# Patient Record
Sex: MALE | Race: WHITE | Employment: FULL TIME | URBAN - METROPOLITAN AREA
[De-identification: names, ages, dates, MRNs, and addresses within clinical notes are randomized per-mention and may not be internally consistent; named-entity substitution may affect disease eponyms.]

---

## 2021-01-04 ENCOUNTER — HOSPITAL ENCOUNTER (EMERGENCY)
Facility: CLINIC | Age: 61
Discharge: HOME | End: 2021-01-04
Attending: PHYSICIAN ASSISTANT | Admitting: PHYSICIAN ASSISTANT

## 2021-01-04 ENCOUNTER — APPOINTMENT (OUTPATIENT)
Dept: GENERAL RADIOLOGY | Facility: CLINIC | Age: 61
End: 2021-01-04
Attending: PHYSICIAN ASSISTANT

## 2021-01-04 VITALS
OXYGEN SATURATION: 99 % | SYSTOLIC BLOOD PRESSURE: 182 MMHG | TEMPERATURE: 98.7 F | RESPIRATION RATE: 18 BRPM | WEIGHT: 147 LBS | HEART RATE: 116 BPM | DIASTOLIC BLOOD PRESSURE: 74 MMHG

## 2021-01-04 DIAGNOSIS — S63.259A DISLOCATION OF FINGER, INITIAL ENCOUNTER: ICD-10-CM

## 2021-01-04 PROCEDURE — 26770 TREAT FINGER DISLOCATION: CPT | Mod: F1 | Performed by: PHYSICIAN ASSISTANT

## 2021-01-04 PROCEDURE — G0463 HOSPITAL OUTPT CLINIC VISIT: HCPCS | Performed by: PHYSICIAN ASSISTANT

## 2021-01-04 PROCEDURE — 73140 X-RAY EXAM OF FINGER(S): CPT | Mod: LT

## 2021-01-04 PROCEDURE — 999N000065 XR FINGER LT G/E 2 VW: Mod: LT

## 2021-01-04 PROCEDURE — 26720 TREAT FINGER FRACTURE EACH: CPT | Mod: F1 | Performed by: PHYSICIAN ASSISTANT

## 2021-01-04 PROCEDURE — 99213 OFFICE O/P EST LOW 20 MIN: CPT | Mod: 25 | Performed by: PHYSICIAN ASSISTANT

## 2021-01-04 PROCEDURE — 26725 TREAT FINGER FRACTURE EACH: CPT | Mod: 54 | Performed by: PHYSICIAN ASSISTANT

## 2021-01-04 NOTE — ED PROVIDER NOTES
History     Chief Complaint   Patient presents with     Fall     Pt states that he tumbled down a flight of stairs. Pt states that he is sore all over but no major injuries except the index finger on the L hand. Pt states that he thinks its either broke or dislocated.     Hand Injury     HPI  Ziggy Torres is a 60 year old right-hand-dominant male who presents to the urgent care with concern over left index finger pain after he sustained a fall on the stairs yesterday evening.  Patient complains of deformity, swelling, ecchymosis.  He denies any distal numbness or paresthesias.  He reports that he does feel some achiness/soreness in other areas and did sustain lacerations to his scalp but no other major injuries reported.  He denies any current headache, dizziness, lightheadedness, nausea, vomiting, chest pain, dyspnea, wheezing, abdominal pain.  He denies any blood thinner use.  He has not attempted any OTC treatments.      Allergies:  No Known Allergies    Problem List:    There are no active problems to display for this patient.     Past Medical History:    History reviewed. No pertinent past medical history.    Past Surgical History:    History reviewed. No pertinent surgical history.    Family History:    History reviewed. No pertinent family history.    Social History:  Marital Status:   [2]  Social History     Tobacco Use     Smoking status: Never Smoker     Smokeless tobacco: Never Used   Substance Use Topics     Alcohol use: Yes     Comment: socially     Drug use: Never     Medications:    No current outpatient medications on file.    Review of Systems  INTEGUMENTARY/SKIN: POSITIVE for ecchymosis NEGATIVE for lacerations, abrasions or worrisome rashes   RESP:NEGATIVE for significant cough or SOB  MUSCULOSKELETAL: POSITIVE  for left index finger pain, swelling  NEURO: NEGATIVE for headache, dizziness, numbness, paresthesias   Physical Exam   BP: (!) 182/74  Pulse: 116  Temp: 98.7  F (37.1   C)  Resp: 18  Weight: 66.7 kg (147 lb)  SpO2: 99 %  Physical Exam  Constitutional:       General: He is not in acute distress.     Appearance: He is not ill-appearing or toxic-appearing.   HENT:      Head: Normocephalic.     Cardiovascular:      Rate and Rhythm: Normal rate and regular rhythm.      Pulses:           Radial pulses are 2+ on the left side.      Heart sounds: No murmur. No friction rub. No gallop.    Pulmonary:      Effort: Pulmonary effort is normal. No respiratory distress.      Breath sounds: Normal breath sounds. No wheezing, rhonchi or rales.   Musculoskeletal:      Left wrist: Normal.      Left hand: He exhibits decreased range of motion (of PIP and DIP joint of index finger), tenderness, bony tenderness, deformity and swelling. He exhibits normal two-point discrimination, normal capillary refill and no laceration. Normal sensation noted. Normal strength noted.   Skin:     General: Skin is warm and dry.      Findings: Ecchymosis present. No erythema, laceration or rash.   Neurological:      Mental Status: He is alert.      Sensory: No sensory deficit.       ED Course        [facility]     -Dislocation - Upper Extremity  Performed by: Zoey Dozier PA-C  Authorized by: Zoey Dozier PA-C       LOCATION     Location:  Finger    Finger location:  L index finger    Finger dislocation type: PIP      PRE PROCEDURE ASSESSMENT      Pre-procedure imaging:  X-ray    Imaging findings: dislocation present and fracture present      Distal perfusion: normal      ANESTHESIA (see MAR for exact dosages)      Anesthesia method:  Nerve block    Block needle gauge:  30 G    Block anesthetic:  Bupivacaine 0.25% w/o epi    Block technique:  Ring block    Block outcome:  Anesthesia achieved    PROCEDURE DETAILS      Manipulation performed: yes      Finger reduction method:  Traction and counter fraction    Reduction successful: yes      Reduction confirmed with imaging: yes       Immobilization:  Splint    Supplies used:  Aluminum splint    POST PROCEDURE DETAILS     Neurological function: normal      Distal perfusion: normal      Range of motion: improved      PROCEDURE   Patient Tolerance:  Patient tolerated the procedure well with no immediate complications              Critical Care time:  none        Results for orders placed or performed during the hospital encounter of 01/04/21 (from the past 24 hour(s))   Fingers XR, 2-3 views, left    Narrative    Examination:  XR FINGER LT G/E 2 VW    Date:  1/4/2021 4:34 PM     Clinical Information: pain after fall, assess for fracture.     Comparison: none.      Impression    Impression:    1.  Dorsal dislocation at the PIP joint of the left index finger.  2.  Small fracture from the volar base of the middle phalanx, measures  3 mm, located along the volar aspect of the joint.  3.  Soft tissue swelling throughout the finger.    CARLA PALOMINO MD   Fingers XR, 2-3 views, left    Narrative    EXAM: XR FINGER LT G/E 2 VW  LOCATION: [facility]  DATE/TIME: 1/4/2021 5:10 PM    INDICATION: Post reduction, follow-up dislocation.  COMPARISON: 01/04/2021.      Impression    IMPRESSION: Interval reduction of the dorsal dislocation at the level of the PIP joint of the left index finger. This is now in anatomic alignment. There is a calcification along the volar aspect of the base of the middle phalanx worrisome for avulsive   injury to the capsular or potential flexor digitorum tendon attachment.           Medications - No data to display    Assessments & Plan (with Medical Decision Making)     I have reviewed the nursing notes.  I have reviewed the findings, diagnosis, plan and need for follow up with the patient.       There are no discharge medications for this patient.    Final diagnoses:   Dislocation of finger, initial encounter     60-year-old male presents to the urgent care with concern over left index finger pain, decreased  range of motion and deformity after sustaining a fall on the steps yesterday evening.  Physical exam findings as described above are significant for lacerations to his scalp, there is deformity of the left index finger with inability to move the DIP or PIP joints with associated ecchymosis, swelling, tenderness to palpation, capillary refill was within normal limits.  He had x-ray which confirmed dorsal dislocation at the PIP joint, small fracture from the volar base of the middle phalanx that measures approximately 3 mm located along the volar aspect of the joint.  After discussing her/benefits he agreed to proceed with reduction.  He tolerated manual reduction without immediate complications and had return to near normal range of motion, however was still somewhat limited due to discomfort.  Postreduction x-rays did confirm that was in anatomic alignment however there was a calcification along the volar aspect of the base of the middle phalanx worrisome for avulsive injury to the capsular or potential flexor digitorum tendon attachment.  He was placed in an aluminum foam splint for immobilization and referral to orthopedic hand was given for follow-up.  Tylenol/ibuprofen as needed for pain control.  I did discuss with patient that if he had presented yesterday would potentially considered primary closure of his scalp wounds however given appearance now and timing since injury will allow wound to heal by secondary intent.  I do not suspect concussion or other clinically significant intracranial trauma.  He was instructed to follow-up as directed by Ortho.  Worrisome reasons to return to the ER/UC sooner discussed.     Disclaimer: This note consists of symbols derived from keyboarding, dictation, and/or voice recognition software. As a result, there may be errors in the script that have gone undetected.  Please consider this when interpreting information found in the chart.    1/4/2021   [facility]  EMERGENCY DEPT     Zoey Dozier PA-C  01/05/21 1523       Zoey Dozier PA-C  01/13/21 1128

## 2021-01-04 NOTE — LETTER
[facility] EMERGENCY DEPT  [address] [address]  Phone: [phone]  Fax: [phone]    January 4, 2021        Ziggy Torres  [address] [facility] [address]          To whom it may concern:    RE: Ziggy Sanford was evaluated in the urgent care for a finger injury on 1/4/2021.  He needs to wear splint until his next follow up visit which should occur within the next 7-10 days.      Please contact me for questions or concerns.      Sincerely,        Zoey Dozier PA-C

## 2021-01-04 NOTE — ED AVS SNAPSHOT
[facility] Emergency Dept  [address] [address]  Phone: [phone]  Fax: [phone]                                    Ziggy Torres   MRN: [id number]    Department: [facility] Emergency Dept   Date of Visit: 1/4/2021           After Visit Summary Signature Page    I have received my discharge instructions, and my questions have been answered. I have discussed any challenges I see with this plan with the nurse or doctor.    ..........................................................................................................................................  Patient/Patient Representative Signature      ..........................................................................................................................................  Patient Representative Print Name and Relationship to Patient    ..................................................               ................................................  Date                                   Time    ..........................................................................................................................................  Reviewed by Signature/Title    ...................................................              ..............................................  Date                                               Time          22EPIC Rev 08/18